# Patient Record
Sex: MALE | Race: WHITE | NOT HISPANIC OR LATINO | Employment: FULL TIME | URBAN - METROPOLITAN AREA
[De-identification: names, ages, dates, MRNs, and addresses within clinical notes are randomized per-mention and may not be internally consistent; named-entity substitution may affect disease eponyms.]

---

## 2023-02-09 ENCOUNTER — OFFICE VISIT (OUTPATIENT)
Dept: FAMILY MEDICINE | Facility: CLINIC | Age: 33
End: 2023-02-09
Payer: COMMERCIAL

## 2023-02-09 VITALS
DIASTOLIC BLOOD PRESSURE: 89 MMHG | RESPIRATION RATE: 20 BRPM | SYSTOLIC BLOOD PRESSURE: 156 MMHG | TEMPERATURE: 97.8 F | HEART RATE: 73 BPM | OXYGEN SATURATION: 99 %

## 2023-02-09 DIAGNOSIS — K02.9 DENTAL DECAY: Primary | ICD-10-CM

## 2023-02-09 PROCEDURE — 99203 OFFICE O/P NEW LOW 30 MIN: CPT | Performed by: FAMILY MEDICINE

## 2023-02-09 RX ORDER — AMOXICILLIN 500 MG/1
500 CAPSULE ORAL 2 TIMES DAILY
Qty: 14 CAPSULE | Refills: 1 | Status: SHIPPED | OUTPATIENT
Start: 2023-02-09 | End: 2023-02-16

## 2023-02-09 NOTE — LETTER
February 9, 2023      [facility] [employer]  [address]  [place] [address]        To Whom It May Concern:    Cesar NADIYA Mario Kelley  was seen on 2/9/2023 .          Sincerely,        Monika Osborne MD

## 2023-02-10 NOTE — PROGRESS NOTES
OUTPATIENT VISIT NOTE                                                   Date of Visit: 2/9/2023     Chief Complaint   Patient presents with:  Dental Pain            History of Present Illness   Cesar Kelley is a 32 year old male has had pain in tooth, feels like he has a tooth abscess for the last 2 days. Has had in the past.  No fever.  Has taken tylenol and ibuprofen.  Missed work.  Has poor dentition due to poor dental care as child       MEDICATIONS   Current Outpatient Medications   Medication     amoxicillin (AMOXIL) 500 MG capsule     FLUoxetine (PROZAC) 20 MG capsule     No current facility-administered medications for this visit.         SOCIAL HISTORY   Social History     Tobacco Use     Smoking status: Not on file     Smokeless tobacco: Not on file   Substance Use Topics     Alcohol use: Not on file           Physical Exam   Vitals:    02/09/23 1918   BP: (!) 156/89   Pulse: 73   Resp: 20   Temp: 97.8  F (36.6  C)   TempSrc: Oral   SpO2: 99%        GEN: Appears uncomfortable.  Mouth: extensive dental decay.  Swollen red right upper gum         Assessment and Plan     Dental decay  Started on amoxicillin.  Tylenol/ibuprofen as needed.  Given low cost dental resources.  - amoxicillin (AMOXIL) 500 MG capsule  Dispense: 14 capsule; Refill: 1                   Discussed signs / symptoms that warrant urgent / emergent medical attention.     Recheck if worsening or not improving.       Monika Osborne MD          Pertinent History     The following portions of the patient's history were reviewed and updated as appropriate: allergies, current medications, past family history, past medical history, past social history, past surgical history and problem list.

## 2023-02-10 NOTE — PATIENT INSTRUCTIONS
Antibiotic as directed.    Tylenol 4000 mg is maximum daily dose.    Ibuprofen 2400 mg is maximum daily dose.    Ice to area.

## 2023-06-15 ENCOUNTER — OFFICE VISIT (OUTPATIENT)
Dept: FAMILY MEDICINE | Facility: CLINIC | Age: 33
End: 2023-06-15
Payer: COMMERCIAL

## 2023-06-15 VITALS
HEART RATE: 80 BPM | SYSTOLIC BLOOD PRESSURE: 162 MMHG | WEIGHT: 217 LBS | OXYGEN SATURATION: 99 % | TEMPERATURE: 98.4 F | DIASTOLIC BLOOD PRESSURE: 112 MMHG | RESPIRATION RATE: 14 BRPM

## 2023-06-15 DIAGNOSIS — K04.7 DENTAL INFECTION: Primary | ICD-10-CM

## 2023-06-15 PROCEDURE — 99213 OFFICE O/P EST LOW 20 MIN: CPT | Performed by: STUDENT IN AN ORGANIZED HEALTH CARE EDUCATION/TRAINING PROGRAM

## 2023-06-15 NOTE — PROGRESS NOTES
"ASSESSMENT & PLAN:   Diagnoses and all orders for this visit:  Dental infection  -     amoxicillin-clavulanate (AUGMENTIN) 875-125 MG tablet; Take 1 tablet by mouth 2 times daily for 14 days    Dental infection with gum edema - start augmentin x 14 days. Advised to get into dentist as soon as possible. Patient given handout with dental resources.    No follow-ups on file.    Patient Instructions   Take Augmentin as directed.     Take acetaminophen and/or ibuprofen as needed for pain  Acetaminophen (tylenol)  325-650 mg every 4-6 hours as needed OR 1000 mg every 6 hours as needed. Maximum dose: 4000 mg/day  Ibuprofen (advil, motrin)  200-400 mg every 4-6 hours as needed -800 mg every 6-8 hours as needed. Maximum dose: 3200 mg/day   There is an emergency dental clinic at the [facility] in [place], which is open Monday through Friday. Please call [phone] and choose \"dental clinic\" option to schedule an appointment.       Additional Dental Clinics with no/reduced fees    [facility]   The Dental Emergency Room  [address], [facility]  [phone]   Accepts [place]     [facility]  [address], [facility]  [phone]   No Fee Hours and services vary each month - call them before going. Sometimes only offer extractions.     [facility]  [address], [facility]  [phone]   Sliding fee: ER visit - $50 up front. Regular - $35 up front   Call or arrive at 7:45 AM on Mondays, Tuesdays, or Thursdays to sign up for same-day appointments for dental pain / emergencies.     [facility]   [address], [facility]   [phone]   Sliding fee. Walk-in's accepted Monday-Friday 8-11AM and 1-4PM    [facility]   [address], [facility]   [phone]   Sliding fee. If no insurance, call first.     [facility]  [address], [facility]  [phone]   Sliding fee. Call 8:00 AM Mon-Thurs for next-day " appointments (for emergencies/pain only). Help with [place] is available.     [facility]  [address] [facility]  [phone]   Call for fees. Walk-in appointments available from 8am-3:30pm. Standard appointments also available.       [facility]  [address] [place] [address]  [phone]   No cost .Open Monday- Thursday, 8am-9pm     [facility]   [address] [facility]  [phone]   Sliding fee - $40 up front. No walk-ins. Call at 8AM Mon-Fri for same- day visits. Can schedule Saturday emergency visits by calling Friday morning.     [facility]  [address] [facility]  [phone]   Sliding fee. No walk-ins. For emergency appointments: Call on Thursday 3PM (for Friday appt) OR Call on Friday 3PM (for Monday appt).    [facility]  [address] [place] [address]  [phone]   Sliding fee    [facility]  [address] [place] [address]  [phone]   Sliding fee. Call for details Mon, Tues, Thurs, Fri- 8am-4:30 PM. Wed 8:30 AM to 8 pm       OTHER RESOURCES   [facility],   [address] [address]  [address] [place] [address]  [phone]    Referral Service, 1-800-DENTIST   Open 9a to 9p 7 days a week    [facility], 1-642.497.4615 For people who are elderly, disabled, or medically compromised and have no other way to pay for dental care. Call to get an application. If approved, services are provided through volunteer dentists.       At the end of the encounter, I discussed results, diagnosis, medications. Discussed red flags for immediate return to clinic/ER, as well as indications for follow up if no improvement. Patient and/or caregiver understood and agreed to plan. Patient was stable for discharge.    ------------------------------------------------------------------------  SUBJECTIVE  Patient presents with:  Dental Pain:  X 9 days, Looking to get some antibiotics, has some teeth that are cracked and thinks its infected.     HPI  Cesar Kelley is a(n) 33 year old male presenting to urgent care for dental infection x 9 days. Has pain of right upper tooth. Pain and swelling worsening since onset. Has had issues with this tooth before.     Review of Systems    Current Outpatient Medications   Medication Sig Dispense Refill     amoxicillin-clavulanate (AUGMENTIN) 875-125 MG tablet Take 1 tablet by mouth 2 times daily for 14 days 28 tablet 0     FLUoxetine (PROZAC) 20 MG capsule Take 20 mg by mouth daily       Problem List:  There are no relevant problems documented for this patient.    No Known Allergies      OBJECTIVE  Vitals:    06/15/23 1234   BP: (!) 162/112   BP Location: Right arm   Patient Position: Sitting   Cuff Size: Adult Regular   Pulse: 80   Resp: 14   Temp: 98.4  F (36.9  C)   TempSrc: Oral   SpO2: 99%   Weight: 98.4 kg (217 lb)     Physical Exam   GENERAL: healthy, alert, no acute distress.   HEAD: normocephalic, atraumatic. No facial edema.  EYE: PERRL. EOMs intact. No scleral injection bilaterally.   NOSE: external nose atraumatic without lesions.  OROPHARYNX: moist mucous membranes. Poor dentition with dental decay. Right upper gums edematous, no drainage. Posterior oropharynx. without erythema or exudate. Uvula midline. Patent airway.    No results found for any visits on 06/15/23.

## 2023-06-15 NOTE — PATIENT INSTRUCTIONS
"Take Augmentin as directed.     Take acetaminophen and/or ibuprofen as needed for pain  Acetaminophen (tylenol)  325-650 mg every 4-6 hours as needed OR 1000 mg every 6 hours as needed. Maximum dose: 4000 mg/day  Ibuprofen (advil, motrin)  200-400 mg every 4-6 hours as needed -800 mg every 6-8 hours as needed. Maximum dose: 3200 mg/day   There is an emergency dental clinic at the [facility] in [place], which is open Monday through Friday. Please call [phone] and choose \"dental clinic\" option to schedule an appointment.       Additional Dental Clinics with no/reduced fees    [facility]   The Dental Emergency Room  [address] [facility], [facility]  [phone]   Accepts [place]     [facility]  [address], [facility]  [phone]   No Fee Hours and services vary each month - call them before going. Sometimes only offer extractions.     [facility]  [address] , [facility]  [phone]   Sliding fee: ER visit - $50 up front. Regular - $35 up front   Call or arrive at 7:45 AM on Mondays, Tuesdays, or Thursdays to sign up for same-day appointments for dental pain / emergencies.     [facility]   [address], [facility]   [phone]   Sliding fee. Walk-in's accepted Monday-Friday 8-11AM and 1-4PM    [facility]   [address], [facility]   [phone]   Sliding fee. If no insurance, call first.     [facility]  [address], [facility]  [phone]   Sliding fee. Call 8:00 AM Mon-Thurs for next-day appointments (for emergencies/pain only). Help with [place] paperwork is available.     [facility]  [address] [facility], [facility]  [phone]   Call for fees. Walk-in appointments available from 8am-3:30pm. Standard appointments also available.       [facility]  [address] [place] [address]  [phone]   No cost .Open Monday- Thursday, " 8am-9pm     [facility]   [address] [facility]  [phone]   Sliding fee - $40 up front. No walk-ins. Call at 8AM Mon-Fri for same- day visits. Can schedule Saturday emergency visits by calling Friday morning.     [facility]  [address] [facility]  [phone]   Sliding fee. No walk-ins. For emergency appointments: Call on Thursday 3PM (for Friday appt) OR Call on Friday 3PM (for Monday appt).    [facility]  [address] [place] [address]  [phone]   Sliding fee    [facility]  [address] [facility]  [phone]   Sliding fee. Call for details Mon, Tues, Thurs, Fri- 8am-4:30 PM. Wed 8:30 AM to 8 pm       OTHER RESOURCES   [facility],   [address] [place] [address]  [address] [place] [address]  [phone]    Referral Service, 1-800-DENTIST   Open 9a to 9p 7 days a week    [facility], 1-711.244.8769 For people who are elderly, disabled, or medically compromised and have no other way to pay for dental care. Call to get an application. If approved, services are provided through volunteer dentists.